# Patient Record
Sex: FEMALE | Race: WHITE | NOT HISPANIC OR LATINO | URBAN - METROPOLITAN AREA
[De-identification: names, ages, dates, MRNs, and addresses within clinical notes are randomized per-mention and may not be internally consistent; named-entity substitution may affect disease eponyms.]

---

## 2023-04-27 ENCOUNTER — NURSING HOME VISIT (OUTPATIENT)
Dept: POST ACUTE CARE | Facility: EXTERNAL LOCATION | Age: 88
End: 2023-04-27
Payer: MEDICARE

## 2023-04-27 DIAGNOSIS — I73.9 PERIPHERAL ARTERY DISEASE (CMS-HCC): ICD-10-CM

## 2023-04-27 DIAGNOSIS — T79.6XXS TRAUMATIC RHABDOMYOLYSIS, SEQUELA: ICD-10-CM

## 2023-04-27 DIAGNOSIS — I15.9 SECONDARY HYPERTENSION: ICD-10-CM

## 2023-04-27 DIAGNOSIS — Z74.09 IMPAIRED FUNCTIONAL MOBILITY AND ACTIVITY TOLERANCE: ICD-10-CM

## 2023-04-27 DIAGNOSIS — F32.9 REACTIVE DEPRESSION: ICD-10-CM

## 2023-04-27 DIAGNOSIS — F03.90 DEMENTIA, UNSPECIFIED DEMENTIA SEVERITY, UNSPECIFIED DEMENTIA TYPE, UNSPECIFIED WHETHER BEHAVIORAL, PSYCHOTIC, OR MOOD DISTURBANCE OR ANXIETY (MULTI): ICD-10-CM

## 2023-04-27 DIAGNOSIS — Z78.9 UNABLE TO CARE FOR SELF: ICD-10-CM

## 2023-04-27 DIAGNOSIS — Z86.59 HISTORY OF SUICIDAL IDEATION: ICD-10-CM

## 2023-04-27 PROCEDURE — 99306 1ST NF CARE HIGH MDM 50: CPT | Performed by: INTERNAL MEDICINE

## 2023-04-27 NOTE — LETTER
Patient: Michelle Walker  : 1934    Encounter Date: 2023    Name: Michelle Walker  : 1934  MRN: [id number]  Visit Date: 2023  Chief Complaint: HISTORY AND PHYSICAL    HPI: 89 y/o, Full Code, Admitted with weakness 2/2 LUCIANO, rhabdomyolysis. Sat on toilet for hours, covered in feces per daughter. IVF improved levels. She had some depression and expressed some SI. Psych was consulted and did not rec inpt stay. Patient was brought to [facility] on 2023 for ongoing med mgt and therapy services.     Subjective: Seen and examined today. Sitting up in bed. Reviewed hospitalization with her. Reports that she is feeling ok. Therapy is going well. Nursing reports no issues. Pt denies N/V/D/C/CP. No fever or chills.     ROS:  As above in subjective. Otherwise, all other systems have been reviewed and are negative for complaint.    Medications:  Medications reviewed and verified in NH chart.     Past Medical/Surgical History:  Past Medical History:   Diagnosis Date   • Pain in unspecified limb     Limb pain   • Personal history of other diseases of the circulatory system     History of cardiac disorder   • Personal history of other diseases of the musculoskeletal system and connective tissue     History of low back pain   • Personal history of other endocrine, nutritional and metabolic disease     History of hyperlipidemia   • Personal history of other mental and behavioral disorders     History of depression       Past Surgical History:   Procedure Laterality Date   • OTHER SURGICAL HISTORY  10/16/2013    Guidance: Concerns About Osteoporosis   • TOTAL HIP ARTHROPLASTY  10/27/2016    Hip Replacement     Social History:  Former Smoker, quit in / Smoked 40 yrs.  Denies ETOH, illicits  Lives alone    Family History:  HTN    Allergy:  Sulfa    Vital Signs:   Vital Signs were reviewed in nursing home documentation.    Physical Exam:  Physical Exam  Vitals reviewed.   Constitutional:       General: She is not in  acute distress.     Appearance: Normal appearance. She is not ill-appearing.   HENT:      Head: Normocephalic and atraumatic.      Right Ear: External ear normal. Decreased hearing noted.      Left Ear: External ear normal. Decreased hearing noted.      Nose: Nose normal.      Mouth/Throat:      Mouth: Mucous membranes are moist.      Pharynx: Oropharynx is clear.   Eyes:      Conjunctiva/sclera: Conjunctivae normal.      Pupils: Pupils are equal, round, and reactive to light.   Cardiovascular:      Rate and Rhythm: Normal rate and regular rhythm.      Heart sounds: Normal heart sounds. No murmur heard.  Pulmonary:      Effort: Pulmonary effort is normal. No respiratory distress.      Breath sounds: Decreased breath sounds present. No wheezing.   Abdominal:      General: There is no distension.      Palpations: Abdomen is soft. There is no mass.      Tenderness: There is no abdominal tenderness.   Musculoskeletal:         General: Normal range of motion.      Cervical back: Normal range of motion and neck supple.   Skin:     General: Skin is warm and dry.   Neurological:      General: No focal deficit present.      Mental Status: She is alert. Mental status is at baseline. She is confused.      Sensory: No sensory deficit.      Motor: Weakness present.      Coordination: Coordination abnormal.      Gait: Gait abnormal.   Psychiatric:         Mood and Affect: Mood is anxious.         Behavior: Behavior normal. Behavior is cooperative.         Cognition and Memory: Memory is impaired.         Results/Data:   Lab Results   Component Value Date    WBC 9.8 04/19/2023    HGB 13.8 04/19/2023    HCT 44.8 04/19/2023     04/19/2023    ALT 25 04/17/2023    AST 72 (H) 04/17/2023     04/19/2023    K 4.3 04/19/2023     (H) 04/19/2023    CREATININE 0.78 04/19/2023    BUN 14 04/19/2023    CO2 22 04/19/2023    TSH 2.25 04/16/2023    INR 1.1 01/28/2020     Results were reviewed and addressed  accordingly.    Provider Impression:   IFM, Weakness 2/2 LUCIANO, Rhabdomylosis  - pt sat on toliet for hours at home  - usually independent in ADL's  - resolved with IVF, hydration  - consult PT OT to eval and tx    Memory Loss, Depression, Recent SI  - psych was consulted during hospitalization and rec lexapro 5mg and ok to discharge to SNF.   - consult ST and psych to eval and tx  - c/w fluoxetine 15mg  - monitor behaviors    HTN, PAD  - c/w metoprolol, aldactone  - c/w plavix, kcL    Code Status  - Full Code  ----------------  Written by Sandy Perdue LPN, acting as a scribe for Dr. Colon. This note accurately reflects the work and decisions made by Dr. Colon.     I, Dr. Colon, attest all medical record entries made by the scribe were under my direction and were personally dictated by me. I have reviewed the chart and agree that the record accurately reflects my performance of the history, physical exam, and assessment and plan.           Electronically Signed By: Reva Rodriguez MD   5/8/23 10:53 AM

## 2023-05-03 PROBLEM — F03.90 DEMENTIA (MULTI): Status: ACTIVE | Noted: 2023-05-03

## 2023-05-03 PROBLEM — I15.9 SECONDARY HYPERTENSION: Status: ACTIVE | Noted: 2023-05-03

## 2023-05-03 PROBLEM — F32.9 REACTIVE DEPRESSION: Status: ACTIVE | Noted: 2023-05-03

## 2023-05-03 PROBLEM — Z74.09 IMPAIRED FUNCTIONAL MOBILITY AND ACTIVITY TOLERANCE: Status: ACTIVE | Noted: 2023-05-03

## 2023-05-03 PROBLEM — Z86.59 HISTORY OF SUICIDAL IDEATION: Status: ACTIVE | Noted: 2023-05-03

## 2023-05-03 PROBLEM — T79.6XXA TRAUMATIC RHABDOMYOLYSIS (CMS-HCC): Status: ACTIVE | Noted: 2023-05-03

## 2023-05-03 PROBLEM — I73.9 PERIPHERAL ARTERY DISEASE (CMS-HCC): Status: ACTIVE | Noted: 2023-05-03

## 2023-05-03 PROBLEM — Z78.9 UNABLE TO CARE FOR SELF: Status: ACTIVE | Noted: 2023-05-03

## 2023-05-03 NOTE — PROGRESS NOTES
Name: Michelle Walker  : 1934  MRN: [id number]  Visit Date: 2023  Chief Complaint: HISTORY AND PHYSICAL    HPI: 89 y/o, Full Code, Admitted with weakness 2/2 LUCIANO, rhabdomyolysis. Sat on toilet for hours, covered in feces per daughter. IVF improved levels. She had some depression and expressed some SI. Psych was consulted and did not rec inpt stay. Patient was brought to [facility] on 2023 for ongoing med mgt and therapy services.     Subjective: Seen and examined today. Sitting up in bed. Reviewed hospitalization with her. Reports that she is feeling ok. Therapy is going well. Nursing reports no issues. Pt denies N/V/D/C/CP. No fever or chills.     ROS:  As above in subjective. Otherwise, all other systems have been reviewed and are negative for complaint.    Medications:  Medications reviewed and verified in NH chart.     Past Medical/Surgical History:  Past Medical History:   Diagnosis Date    Pain in unspecified limb     Limb pain    Personal history of other diseases of the circulatory system     History of cardiac disorder    Personal history of other diseases of the musculoskeletal system and connective tissue     History of low back pain    Personal history of other endocrine, nutritional and metabolic disease     History of hyperlipidemia    Personal history of other mental and behavioral disorders     History of depression       Past Surgical History:   Procedure Laterality Date    OTHER SURGICAL HISTORY  10/16/2013    Guidance: Concerns About Osteoporosis    TOTAL HIP ARTHROPLASTY  10/27/2016    Hip Replacement     Social History:  Former Smoker, quit in / Smoked 40 yrs.  Denies ETOH, illicits  Lives alone    Family History:  HTN    Allergy:  Sulfa    Vital Signs:   Vital Signs were reviewed in nursing home documentation.    Physical Exam:  Physical Exam  Vitals reviewed.   Constitutional:       General: She is not in acute distress.     Appearance: Normal appearance. She is not ill-appearing.    HENT:      Head: Normocephalic and atraumatic.      Right Ear: External ear normal. Decreased hearing noted.      Left Ear: External ear normal. Decreased hearing noted.      Nose: Nose normal.      Mouth/Throat:      Mouth: Mucous membranes are moist.      Pharynx: Oropharynx is clear.   Eyes:      Conjunctiva/sclera: Conjunctivae normal.      Pupils: Pupils are equal, round, and reactive to light.   Cardiovascular:      Rate and Rhythm: Normal rate and regular rhythm.      Heart sounds: Normal heart sounds. No murmur heard.  Pulmonary:      Effort: Pulmonary effort is normal. No respiratory distress.      Breath sounds: Decreased breath sounds present. No wheezing.   Abdominal:      General: There is no distension.      Palpations: Abdomen is soft. There is no mass.      Tenderness: There is no abdominal tenderness.   Musculoskeletal:         General: Normal range of motion.      Cervical back: Normal range of motion and neck supple.   Skin:     General: Skin is warm and dry.   Neurological:      General: No focal deficit present.      Mental Status: She is alert. Mental status is at baseline. She is confused.      Sensory: No sensory deficit.      Motor: Weakness present.      Coordination: Coordination abnormal.      Gait: Gait abnormal.   Psychiatric:         Mood and Affect: Mood is anxious.         Behavior: Behavior normal. Behavior is cooperative.         Cognition and Memory: Memory is impaired.         Results/Data:   Lab Results   Component Value Date    WBC 9.8 04/19/2023    HGB 13.8 04/19/2023    HCT 44.8 04/19/2023     04/19/2023    ALT 25 04/17/2023    AST 72 (H) 04/17/2023     04/19/2023    K 4.3 04/19/2023     (H) 04/19/2023    CREATININE 0.78 04/19/2023    BUN 14 04/19/2023    CO2 22 04/19/2023    TSH 2.25 04/16/2023    INR 1.1 01/28/2020     Results were reviewed and addressed accordingly.    Provider Impression:   IFM, Weakness 2/2 LUCIANO, Rhabdomylosis  - pt sat on rickey for  hours at home  - usually independent in ADL's  - resolved with IVF, hydration  - consult PT OT to eval and tx    Memory Loss, Depression, Recent SI  - psych was consulted during hospitalization and rec lexapro 5mg and ok to discharge to SNF.   - consult ST and psych to eval and tx  - c/w fluoxetine 15mg  - monitor behaviors    HTN, PAD  - c/w metoprolol, aldactone  - c/w plavix, kcL    Code Status  - Full Code  ----------------  Written by Sandy Perdue LPN, acting as a scribe for Dr. Colon. This note accurately reflects the work and decisions made by Dr. Colon.     I, Dr. Colon, attest all medical record entries made by the scribe were under my direction and were personally dictated by me. I have reviewed the chart and agree that the record accurately reflects my performance of the history, physical exam, and assessment and plan.

## 2023-05-04 ENCOUNTER — NURSING HOME VISIT (OUTPATIENT)
Dept: POST ACUTE CARE | Facility: EXTERNAL LOCATION | Age: 88
End: 2023-05-04
Payer: MEDICARE

## 2023-05-04 DIAGNOSIS — Z74.09 IMPAIRED FUNCTIONAL MOBILITY AND ACTIVITY TOLERANCE: ICD-10-CM

## 2023-05-04 DIAGNOSIS — F32.9 REACTIVE DEPRESSION: ICD-10-CM

## 2023-05-04 DIAGNOSIS — I73.9 PERIPHERAL ARTERY DISEASE (CMS-HCC): ICD-10-CM

## 2023-05-04 DIAGNOSIS — Z86.59 HISTORY OF SUICIDAL IDEATION: ICD-10-CM

## 2023-05-04 DIAGNOSIS — T79.6XXS TRAUMATIC RHABDOMYOLYSIS, SEQUELA: ICD-10-CM

## 2023-05-04 DIAGNOSIS — F03.90 DEMENTIA, UNSPECIFIED DEMENTIA SEVERITY, UNSPECIFIED DEMENTIA TYPE, UNSPECIFIED WHETHER BEHAVIORAL, PSYCHOTIC, OR MOOD DISTURBANCE OR ANXIETY (MULTI): ICD-10-CM

## 2023-05-04 DIAGNOSIS — Z78.9 UNABLE TO CARE FOR SELF: ICD-10-CM

## 2023-05-04 PROCEDURE — 99309 SBSQ NF CARE MODERATE MDM 30: CPT | Performed by: INTERNAL MEDICINE

## 2023-05-04 NOTE — LETTER
Patient: Michelle Walker  : 1934    Encounter Date: 2023    Name: Michelle Walker  : 1934  MRN: [id number]  Visit Date: 2023  Chief Complaint: WEEKLY SNF PHYSICIAN VISIT    HPI: 87 y/o, Full Code, Admitted with weakness 2/2 LUCIANO, rhabdomyolysis. Sat on toilet for hours, covered in feces per daughter. IVF improved levels. She had some depression and expressed some SI. Psych was consulted and did not rec inpt stay. Patient was brought to [facility] on 2023 for ongoing med mgt and therapy services.     Subjective: Seen and examined today. Sitting up in bed. Reports that she is feeling ok. Therapy is going well. Nursing reports no issues. Pt denies N/V/D/C/CP. No fever or chills. She is pleasantly confused on exam.    ROS:  As above in subjective. Otherwise, all other systems have been reviewed and are negative for complaint.    Medications:  Medications reviewed and verified in NH chart.     Allergy:  Sulfa    Vital Signs:   Vital Signs were reviewed in nursing home documentation.    Physical Exam:  Physical Exam  Vitals reviewed.   Constitutional:       General: She is not in acute distress.     Appearance: Normal appearance. She is not ill-appearing.   HENT:      Head: Normocephalic and atraumatic.      Right Ear: External ear normal. Decreased hearing noted.      Left Ear: External ear normal. Decreased hearing noted.      Nose: Nose normal.      Mouth/Throat:      Mouth: Mucous membranes are moist.      Pharynx: Oropharynx is clear.   Eyes:      Conjunctiva/sclera: Conjunctivae normal.      Pupils: Pupils are equal, round, and reactive to light.   Cardiovascular:      Rate and Rhythm: Normal rate and regular rhythm.      Heart sounds: Normal heart sounds. No murmur heard.  Pulmonary:      Effort: Pulmonary effort is normal. No respiratory distress.      Breath sounds: Decreased breath sounds present. No wheezing.   Abdominal:      General: There is no distension.      Palpations: Abdomen is soft.  There is no mass.      Tenderness: There is no abdominal tenderness.   Musculoskeletal:         General: Normal range of motion.      Cervical back: Normal range of motion and neck supple.   Skin:     General: Skin is warm and dry.   Neurological:      General: No focal deficit present.      Mental Status: She is alert. Mental status is at baseline. She is confused.      Sensory: No sensory deficit.      Motor: Weakness present.      Coordination: Coordination abnormal.      Gait: Gait abnormal.   Psychiatric:         Mood and Affect: Mood is anxious.         Behavior: Behavior normal. Behavior is cooperative.         Cognition and Memory: Memory is impaired.         Results/Data:   Lab Results   Component Value Date    WBC 9.8 04/19/2023    HGB 13.8 04/19/2023    HCT 44.8 04/19/2023     04/19/2023    ALT 25 04/17/2023    AST 72 (H) 04/17/2023     04/19/2023    K 4.3 04/19/2023     (H) 04/19/2023    CREATININE 0.78 04/19/2023    BUN 14 04/19/2023    CO2 22 04/19/2023    TSH 2.25 04/16/2023    INR 1.1 01/28/2020     Results were reviewed and addressed accordingly.    Provider Impression:   IFM, Weakness 2/2 LUCIANO, Rhabdomylosis  - pt sat on toliet for hours at home  - usually independent in ADL's  - resolved with IVF, hydration  - consulted PT OT to eval and tx    Memory Loss, Depression, Recent SI  - psych was consulted during hospitalization and rec lexapro 5mg and ok to discharge to SNF.   - consulted ST and psych to eval and tx  - c/w fluoxetine 15mg  - monitor behaviors    HTN, PAD  - c/w metoprolol, aldactone  - c/w plavix, kcL    Code Status  - Full Code  ----------------  Written by Sandy Perdue LPN, acting as a scribe for Dr. Colon. This note accurately reflects the work and decisions made by Dr. Colon.     I, Dr. Colon, attest all medical record entries made by the scribe were under my direction and were personally dictated by me. I have reviewed the chart and agree that the record  accurately reflects my performance of the history, physical exam, and assessment and plan.           Electronically Signed By: Reva Rodriguez MD   5/9/23  2:00 PM

## 2023-05-08 NOTE — PROGRESS NOTES
Name: Michelle Walker  : 1934  MRN: [id number]  Visit Date: 2023  Chief Complaint: WEEKLY SNF PHYSICIAN VISIT    HPI: 89 y/o, Full Code, Admitted with weakness 2/2 LUCIANO, rhabdomyolysis. Sat on toilet for hours, covered in feces per daughter. IVF improved levels. She had some depression and expressed some SI. Psych was consulted and did not rec inpt stay. Patient was brought to [facility] on 2023 for ongoing med mgt and therapy services.     Subjective: Seen and examined today. Sitting up in bed. Reports that she is feeling ok. Therapy is going well. Nursing reports no issues. Pt denies N/V/D/C/CP. No fever or chills. She is pleasantly confused on exam.    ROS:  As above in subjective. Otherwise, all other systems have been reviewed and are negative for complaint.    Medications:  Medications reviewed and verified in NH chart.     Allergy:  Sulfa    Vital Signs:   Vital Signs were reviewed in nursing home documentation.    Physical Exam:  Physical Exam  Vitals reviewed.   Constitutional:       General: She is not in acute distress.     Appearance: Normal appearance. She is not ill-appearing.   HENT:      Head: Normocephalic and atraumatic.      Right Ear: External ear normal. Decreased hearing noted.      Left Ear: External ear normal. Decreased hearing noted.      Nose: Nose normal.      Mouth/Throat:      Mouth: Mucous membranes are moist.      Pharynx: Oropharynx is clear.   Eyes:      Conjunctiva/sclera: Conjunctivae normal.      Pupils: Pupils are equal, round, and reactive to light.   Cardiovascular:      Rate and Rhythm: Normal rate and regular rhythm.      Heart sounds: Normal heart sounds. No murmur heard.  Pulmonary:      Effort: Pulmonary effort is normal. No respiratory distress.      Breath sounds: Decreased breath sounds present. No wheezing.   Abdominal:      General: There is no distension.      Palpations: Abdomen is soft. There is no mass.      Tenderness: There is no abdominal tenderness.    Musculoskeletal:         General: Normal range of motion.      Cervical back: Normal range of motion and neck supple.   Skin:     General: Skin is warm and dry.   Neurological:      General: No focal deficit present.      Mental Status: She is alert. Mental status is at baseline. She is confused.      Sensory: No sensory deficit.      Motor: Weakness present.      Coordination: Coordination abnormal.      Gait: Gait abnormal.   Psychiatric:         Mood and Affect: Mood is anxious.         Behavior: Behavior normal. Behavior is cooperative.         Cognition and Memory: Memory is impaired.         Results/Data:   Lab Results   Component Value Date    WBC 9.8 04/19/2023    HGB 13.8 04/19/2023    HCT 44.8 04/19/2023     04/19/2023    ALT 25 04/17/2023    AST 72 (H) 04/17/2023     04/19/2023    K 4.3 04/19/2023     (H) 04/19/2023    CREATININE 0.78 04/19/2023    BUN 14 04/19/2023    CO2 22 04/19/2023    TSH 2.25 04/16/2023    INR 1.1 01/28/2020     Results were reviewed and addressed accordingly.    Provider Impression:   IFM, Weakness 2/2 LUCIANO, Rhabdomylosis  - pt sat on toliet for hours at home  - usually independent in ADL's  - resolved with IVF, hydration  - consulted PT OT to eval and tx    Memory Loss, Depression, Recent SI  - psych was consulted during hospitalization and rec lexapro 5mg and ok to discharge to SNF.   - consulted ST and psych to eval and tx  - c/w fluoxetine 15mg  - monitor behaviors    HTN, PAD  - c/w metoprolol, aldactone  - c/w plavix, kcL    Code Status  - Full Code  ----------------  Written by Sandy Perdue LPN, acting as a scribe for Dr. Colon. This note accurately reflects the work and decisions made by Dr. Colon.     I, Dr. Colon, attest all medical record entries made by the scribe were under my direction and were personally dictated by me. I have reviewed the chart and agree that the record accurately reflects my performance of the history, physical exam, and  assessment and plan.